# Patient Record
Sex: FEMALE | Race: WHITE
[De-identification: names, ages, dates, MRNs, and addresses within clinical notes are randomized per-mention and may not be internally consistent; named-entity substitution may affect disease eponyms.]

---

## 2018-12-19 ENCOUNTER — HOSPITAL ENCOUNTER (EMERGENCY)
Dept: HOSPITAL 96 - M.ERS | Age: 58
Discharge: HOME | End: 2018-12-19
Payer: COMMERCIAL

## 2018-12-19 VITALS — DIASTOLIC BLOOD PRESSURE: 73 MMHG | SYSTOLIC BLOOD PRESSURE: 121 MMHG

## 2018-12-19 VITALS — BODY MASS INDEX: 31.01 KG/M2 | HEIGHT: 63 IN | WEIGHT: 175 LBS

## 2018-12-19 DIAGNOSIS — R07.89: ICD-10-CM

## 2018-12-19 DIAGNOSIS — F32.9: ICD-10-CM

## 2018-12-19 DIAGNOSIS — Z98.890: ICD-10-CM

## 2018-12-19 DIAGNOSIS — K52.9: Primary | ICD-10-CM

## 2018-12-19 LAB
ABSOLUTE BASOPHILS: 0 THOU/UL (ref 0–0.2)
ABSOLUTE EOSINOPHILS: 0.1 THOU/UL (ref 0–0.7)
ABSOLUTE MONOCYTES: 0.6 THOU/UL (ref 0–1.2)
ALBUMIN SERPL-MCNC: 4 G/DL (ref 3.4–5)
ALP SERPL-CCNC: 81 U/L (ref 46–116)
ALT SERPL-CCNC: 32 U/L (ref 30–65)
ANION GAP SERPL CALC-SCNC: 8 MMOL/L (ref 7–16)
APTT BLD: 19.1 SECONDS (ref 25–31.3)
AST SERPL-CCNC: 21 U/L (ref 15–37)
BASOPHILS NFR BLD AUTO: 0.2 %
BILIRUB SERPL-MCNC: 0.4 MG/DL
BUN SERPL-MCNC: 12 MG/DL (ref 7–18)
CALCIUM SERPL-MCNC: 9.2 MG/DL (ref 8.5–10.1)
CHLORIDE SERPL-SCNC: 103 MMOL/L (ref 98–107)
CK-MB MASS: 0.9 NG/ML
CO2 SERPL-SCNC: 30 MMOL/L (ref 21–32)
CREAT SERPL-MCNC: 0.8 MG/DL (ref 0.6–1.3)
EOSINOPHIL NFR BLD: 0.9 %
GLUCOSE SERPL-MCNC: 150 MG/DL (ref 70–99)
GRANULOCYTES NFR BLD MANUAL: 79.6 %
HCT VFR BLD CALC: 43.8 % (ref 37–47)
HGB BLD-MCNC: 14.7 GM/DL (ref 12–15)
INR PPP: 0.9
LIPASE: 133 U/L (ref 73–393)
LYMPHOCYTES # BLD: 1.8 THOU/UL (ref 0.8–5.3)
LYMPHOCYTES NFR BLD AUTO: 14.5 %
MAGNESIUM SERPL-MCNC: 1.9 MG/DL (ref 1.8–2.4)
MCH RBC QN AUTO: 32.4 PG (ref 26–34)
MCHC RBC AUTO-ENTMCNC: 33.5 G/DL (ref 28–37)
MCV RBC: 96.7 FL (ref 80–100)
MONOCYTES NFR BLD: 4.8 %
MPV: 8.7 FL. (ref 7.2–11.1)
NEUTROPHILS # BLD: 9.8 THOU/UL (ref 1.6–8.1)
NT-PRO BRAIN NAT PEPTIDE: 41 PG/ML (ref ?–300)
NUCLEATED RBCS: 0 /100WBC
PLATELET COUNT*: 268 THOU/UL (ref 150–400)
POTASSIUM SERPL-SCNC: 3.9 MMOL/L (ref 3.5–5.1)
PROT SERPL-MCNC: 7.5 G/DL (ref 6.4–8.2)
PROTHROMBIN TIME: 9.5 SECONDS (ref 9.2–11.5)
RBC # BLD AUTO: 4.53 MIL/UL (ref 4.2–5)
RDW-CV: 13 % (ref 10.5–14.5)
SODIUM SERPL-SCNC: 141 MMOL/L (ref 136–145)
TROPONIN-I LEVEL: <0.06 NG/ML (ref ?–0.06)
WBC # BLD AUTO: 12.4 THOU/UL (ref 4–11)

## 2018-12-21 NOTE — EKG
[place] [address]
Phone:  [phone]                     ELECTROCARDIOGRAM REPORT      
_______________________________________________________________________________
 
Name:       JUANIS MALIN                  Room:                      [facility]#:  [id number]      Account #:      [id number]  
Admission:  18     Attend Phys:                         
Discharge:  18     Date of Birth:  [birth year]  
         Report #: [id number]
    [id number]
_______________________________________________________________________________
THIS REPORT FOR:  //name//                      
 
                         [facility] ED
                                       
Test Date:    2018               Test Time:    19:13:59
Pat Name:     JUANIS MALIN              Department:   
Patient ID:   [id number]            Room:          
Gender:       F                        Technician:   CHOLO
:          1960               Requested By: Carl Thakur
Order Number: [id number] MD:   John Reed
                                 Measurements
Intervals                              Axis          
Rate:         102                      P:            49
NH:           149                      QRS:          -20
QRSD:         95                       T:            45
QT:           337                                    
QTc:          439                                    
                           Interpretive Statements
Sinus tachycardia
Probable left atrial enlargement
Borderline left axis deviation
Compared to ECG 2017 21:32:46
Sinus rate has increased
 
Electronically Signed On 2018 14:43:41 CST by John Reed
[email or web address]
 
 
 
 
 
 
 
 
 
 
 
 
 
 
 
 
 
  <ELECTRONICALLY SIGNED>
                                           By: John Reed MD, [facility]     
  18     1443
D: 18   _____________________________________
T: 18   John Reed MD, FACC       /EPI

## 2018-12-22 ENCOUNTER — HOSPITAL ENCOUNTER (INPATIENT)
Dept: HOSPITAL 96 - M.ERS | Age: 58
LOS: 10 days | Discharge: HOME | DRG: 368 | End: 2019-01-01
Attending: FAMILY MEDICINE | Admitting: FAMILY MEDICINE
Payer: COMMERCIAL

## 2018-12-22 VITALS — HEIGHT: 63 IN | WEIGHT: 203 LBS | BODY MASS INDEX: 35.97 KG/M2

## 2018-12-22 VITALS — SYSTOLIC BLOOD PRESSURE: 112 MMHG | DIASTOLIC BLOOD PRESSURE: 67 MMHG

## 2018-12-22 VITALS — DIASTOLIC BLOOD PRESSURE: 73 MMHG | SYSTOLIC BLOOD PRESSURE: 128 MMHG

## 2018-12-22 VITALS — SYSTOLIC BLOOD PRESSURE: 107 MMHG | DIASTOLIC BLOOD PRESSURE: 62 MMHG

## 2018-12-22 VITALS — SYSTOLIC BLOOD PRESSURE: 146 MMHG | DIASTOLIC BLOOD PRESSURE: 99 MMHG

## 2018-12-22 VITALS — SYSTOLIC BLOOD PRESSURE: 131 MMHG | DIASTOLIC BLOOD PRESSURE: 81 MMHG

## 2018-12-22 DIAGNOSIS — Z98.891: ICD-10-CM

## 2018-12-22 DIAGNOSIS — K63.9: ICD-10-CM

## 2018-12-22 DIAGNOSIS — B37.0: ICD-10-CM

## 2018-12-22 DIAGNOSIS — K57.30: ICD-10-CM

## 2018-12-22 DIAGNOSIS — F32.9: ICD-10-CM

## 2018-12-22 DIAGNOSIS — R65.11: ICD-10-CM

## 2018-12-22 DIAGNOSIS — D69.6: ICD-10-CM

## 2018-12-22 DIAGNOSIS — K31.9: ICD-10-CM

## 2018-12-22 DIAGNOSIS — K76.89: ICD-10-CM

## 2018-12-22 DIAGNOSIS — Z79.899: ICD-10-CM

## 2018-12-22 DIAGNOSIS — R74.0: ICD-10-CM

## 2018-12-22 DIAGNOSIS — A04.9: ICD-10-CM

## 2018-12-22 DIAGNOSIS — E86.0: ICD-10-CM

## 2018-12-22 DIAGNOSIS — E87.6: ICD-10-CM

## 2018-12-22 DIAGNOSIS — Z90.710: ICD-10-CM

## 2018-12-22 DIAGNOSIS — K64.9: ICD-10-CM

## 2018-12-22 DIAGNOSIS — Z85.820: ICD-10-CM

## 2018-12-22 DIAGNOSIS — Z90.49: ICD-10-CM

## 2018-12-22 DIAGNOSIS — K62.89: ICD-10-CM

## 2018-12-22 DIAGNOSIS — B37.81: Primary | ICD-10-CM

## 2018-12-22 DIAGNOSIS — K26.9: ICD-10-CM

## 2018-12-22 DIAGNOSIS — E66.9: ICD-10-CM

## 2018-12-22 DIAGNOSIS — K29.70: ICD-10-CM

## 2018-12-22 LAB
ABSOLUTE BASOPHILS: 0.1 THOU/UL (ref 0–0.2)
ABSOLUTE EOSINOPHILS: 0.6 THOU/UL (ref 0–0.7)
ABSOLUTE MONOCYTES: 0.3 THOU/UL (ref 0–1.2)
ALBUMIN SERPL-MCNC: 3.5 G/DL (ref 3.4–5)
ALP SERPL-CCNC: 242 U/L (ref 46–116)
ALT SERPL-CCNC: 305 U/L (ref 30–65)
ANION GAP SERPL CALC-SCNC: 10 MMOL/L (ref 7–16)
AST SERPL-CCNC: 198 U/L (ref 15–37)
BASOPHILS NFR BLD AUTO: 1 %
BILIRUB SERPL-MCNC: 1.6 MG/DL
BUN SERPL-MCNC: 6 MG/DL (ref 7–18)
CALCIUM SERPL-MCNC: 8.6 MG/DL (ref 8.5–10.1)
CHLORIDE SERPL-SCNC: 95 MMOL/L (ref 98–107)
CO2 SERPL-SCNC: 28 MMOL/L (ref 21–32)
CREAT SERPL-MCNC: 0.8 MG/DL (ref 0.6–1.3)
EOSINOPHIL NFR BLD: 5 %
GLUCOSE SERPL-MCNC: 110 MG/DL (ref 70–99)
GRANULOCYTES NFR BLD MANUAL: 84 %
HCT VFR BLD CALC: 43.2 % (ref 37–47)
HGB BLD-MCNC: 14.5 GM/DL (ref 12–15)
LIPASE: 94 U/L (ref 73–393)
LYMPHOCYTES # BLD: 0.6 THOU/UL (ref 0.8–5.3)
LYMPHOCYTES NFR BLD AUTO: 5 %
MCH RBC QN AUTO: 32.2 PG (ref 26–34)
MCHC RBC AUTO-ENTMCNC: 33.5 G/DL (ref 28–37)
MCV RBC: 96.2 FL (ref 80–100)
MONOCYTES NFR BLD: 3 %
MPV: 8.4 FL. (ref 7.2–11.1)
NEUTROPHILS # BLD: 9.7 THOU/UL (ref 1.6–8.1)
NEUTS BAND NFR BLD: 2 %
NUCLEATED RBCS: 0 /100WBC
PLATELET # BLD EST: ADEQUATE 10*3/UL
PLATELET COUNT*: 143 THOU/UL (ref 150–400)
POTASSIUM SERPL-SCNC: 3.6 MMOL/L (ref 3.5–5.1)
PROT SERPL-MCNC: 7.4 G/DL (ref 6.4–8.2)
RBC # BLD AUTO: 4.5 MIL/UL (ref 4.2–5)
RDW-CV: 13 % (ref 10.5–14.5)
SODIUM SERPL-SCNC: 133 MMOL/L (ref 136–145)
TARGETS BLD QL SMEAR: (no result)
WBC # BLD AUTO: 11.3 THOU/UL (ref 4–11)

## 2018-12-22 NOTE — PROC
[address]  [address]                    PROCEDURE REPORT              
_______________________________________________________________________________
 
Name:       MALIN,JUANIS NADIRA                  Room:           [address] [place]  
.R.#:  [id number]      Account #:      [id number]  
Admission:  12/22/18     Attend Phys:    Sudheer Trevino MD
Discharge:               Date of Birth:  [birth year]  
         Report #: [id number]
                                                                                
_______________________________________________________________________________
THIS REPORT FOR:  //name//                      
 
For GI report, please see the Provation report in Perceptive 7 content.
 
 
 
 
 
 
 
 
 
 
 
 
 
 
 
 
 
 
 
 
 
 
 
 
 
 
 
 
 
 
 
 
 
 
 
 
 
 
 
 
 
                       
                                        By:                                
                 
D: 12/24/18     _______________________________________
T: 12/28/18 1227Medical Records Staff PARKER       /AL

## 2018-12-23 VITALS — DIASTOLIC BLOOD PRESSURE: 72 MMHG | SYSTOLIC BLOOD PRESSURE: 110 MMHG

## 2018-12-23 VITALS — SYSTOLIC BLOOD PRESSURE: 112 MMHG | DIASTOLIC BLOOD PRESSURE: 63 MMHG

## 2018-12-23 VITALS — SYSTOLIC BLOOD PRESSURE: 131 MMHG | DIASTOLIC BLOOD PRESSURE: 72 MMHG

## 2018-12-23 LAB
ABSOLUTE BASOPHILS: 0 THOU/UL (ref 0–0.2)
ABSOLUTE EOSINOPHILS: 0.3 THOU/UL (ref 0–0.7)
ABSOLUTE MONOCYTES: 0.5 THOU/UL (ref 0–1.2)
ALBUMIN SERPL-MCNC: 2.5 G/DL (ref 3.4–5)
ALP SERPL-CCNC: 194 U/L (ref 46–116)
ALT SERPL-CCNC: 183 U/L (ref 30–65)
ANION GAP SERPL CALC-SCNC: 11 MMOL/L (ref 7–16)
ANION GAP SERPL CALC-SCNC: 9 MMOL/L (ref 7–16)
AST SERPL-CCNC: 98 U/L (ref 15–37)
BASOPHILS NFR BLD AUTO: 0.3 %
BILIRUB SERPL-MCNC: 1.6 MG/DL
BUN SERPL-MCNC: 4 MG/DL (ref 7–18)
BUN SERPL-MCNC: 4 MG/DL (ref 7–18)
CALCIUM SERPL-MCNC: 7.3 MG/DL (ref 8.5–10.1)
CALCIUM SERPL-MCNC: 7.7 MG/DL (ref 8.5–10.1)
CHLORIDE SERPL-SCNC: 104 MMOL/L (ref 98–107)
CHLORIDE SERPL-SCNC: 105 MMOL/L (ref 98–107)
CO2 SERPL-SCNC: 22 MMOL/L (ref 21–32)
CO2 SERPL-SCNC: 25 MMOL/L (ref 21–32)
CREAT SERPL-MCNC: 0.6 MG/DL (ref 0.6–1.3)
CREAT SERPL-MCNC: 0.7 MG/DL (ref 0.6–1.3)
EOSINOPHIL NFR BLD: 5.5 %
GLUCOSE SERPL-MCNC: 106 MG/DL (ref 70–99)
GLUCOSE SERPL-MCNC: 106 MG/DL (ref 70–99)
GRANULOCYTES NFR BLD MANUAL: 78.2 %
HCT VFR BLD CALC: 38.4 % (ref 37–47)
HCT VFR BLD CALC: 39.6 % (ref 37–47)
HGB BLD-MCNC: 13 GM/DL (ref 12–15)
HGB BLD-MCNC: 13.1 GM/DL (ref 12–15)
INR PPP: 1.2
LYMPHOCYTES # BLD: 0.5 THOU/UL (ref 0.8–5.3)
LYMPHOCYTES NFR BLD AUTO: 7.8 %
MAGNESIUM SERPL-MCNC: 1.8 MG/DL (ref 1.8–2.4)
MAGNESIUM SERPL-MCNC: 1.9 MG/DL (ref 1.8–2.4)
MCH RBC QN AUTO: 32.4 PG (ref 26–34)
MCH RBC QN AUTO: 32.6 PG (ref 26–34)
MCHC RBC AUTO-ENTMCNC: 33.1 G/DL (ref 28–37)
MCHC RBC AUTO-ENTMCNC: 33.8 G/DL (ref 28–37)
MCV RBC: 96.2 FL (ref 80–100)
MCV RBC: 98 FL (ref 80–100)
MONOCYTES NFR BLD: 8.2 %
MPV: 8.6 FL. (ref 7.2–11.1)
MPV: 8.9 FL. (ref 7.2–11.1)
NEUTROPHILS # BLD: 4.7 THOU/UL (ref 1.6–8.1)
NUCLEATED RBCS: 0 /100WBC
PLATELET COUNT*: 121 THOU/UL (ref 150–400)
PLATELET COUNT*: 95 THOU/UL (ref 150–400)
POTASSIUM SERPL-SCNC: 3.3 MMOL/L (ref 3.5–5.1)
POTASSIUM SERPL-SCNC: 3.7 MMOL/L (ref 3.5–5.1)
PROT SERPL-MCNC: 4.8 G/DL (ref 6.4–8.2)
PROTHROMBIN TIME: 12.5 SECONDS (ref 9.2–11.5)
RBC # BLD AUTO: 3.99 MIL/UL (ref 4.2–5)
RBC # BLD AUTO: 4.04 MIL/UL (ref 4.2–5)
RDW-CV: 13.2 % (ref 10.5–14.5)
RDW-CV: 13.3 % (ref 10.5–14.5)
SODIUM SERPL-SCNC: 137 MMOL/L (ref 136–145)
SODIUM SERPL-SCNC: 139 MMOL/L (ref 136–145)
WBC # BLD AUTO: 6 THOU/UL (ref 4–11)
WBC # BLD AUTO: 7.4 THOU/UL (ref 4–11)

## 2018-12-23 NOTE — EKG
[place] [address]
Phone:  [phone]                     ELECTROCARDIOGRAM REPORT      
_______________________________________________________________________________
 
Name:       JUANIS MALIN                  Room:           [address]    ADM [place]  
[facility]#:  [id number]      Account #:      [id number]  
Admission:  18     Attend Phys:    Sudheer Trevino MD
Discharge:               Date of Birth:  [birth year]  
         Report #: [id number]
    [id number]
_______________________________________________________________________________
THIS REPORT FOR:  //name//                      
 
                         [facility] ED
                                       
Test Date:    2018               Test Time:    12:47:37
Pat Name:     JUANIS MALIN              Department:   
Patient ID:   [id number]            Room:         [facility]
Gender:       F                        Technician:   DENISE EDGAR
:          1960               Requested By: Carl Thakur
Order Number: [id number] MD:   Phil Max
                                 Measurements
Intervals                              Axis          
Rate:         100                      P:            
NC:                                    QRS:          -7
QRSD:         146                      T:            32
QT:           344                                    
QTc:          444                                    
                           Interpretive Statements
sinus tachycardia
artifact noted
Baseline wander in lead(s) V2
Compared to ECG 2018 19:13:59
no change
 
Electronically Signed On 2018 13:43:04 CST by Phil Max
[email or web address]
 
 
 
 
 
 
 
 
 
 
 
 
 
 
 
 
 
  <ELECTRONICALLY SIGNED>
                                           By: Phil Max MD, [facility]      
  18     1343
D: 18 1247   _____________________________________
T: 18 1247   Phil Max MD, [facility]        /EPI

## 2018-12-24 VITALS — DIASTOLIC BLOOD PRESSURE: 73 MMHG | SYSTOLIC BLOOD PRESSURE: 139 MMHG

## 2018-12-24 VITALS — SYSTOLIC BLOOD PRESSURE: 124 MMHG | DIASTOLIC BLOOD PRESSURE: 68 MMHG

## 2018-12-24 VITALS — SYSTOLIC BLOOD PRESSURE: 130 MMHG | DIASTOLIC BLOOD PRESSURE: 80 MMHG

## 2018-12-24 VITALS — DIASTOLIC BLOOD PRESSURE: 59 MMHG | SYSTOLIC BLOOD PRESSURE: 132 MMHG

## 2018-12-24 LAB
ALBUMIN SERPL-MCNC: 2.3 G/DL (ref 3.4–5)
ALP SERPL-CCNC: 202 U/L (ref 46–116)
ALT SERPL-CCNC: 139 U/L (ref 30–65)
ANION GAP SERPL CALC-SCNC: 5 MMOL/L (ref 7–16)
AST SERPL-CCNC: 65 U/L (ref 15–37)
BILIRUB SERPL-MCNC: 1.1 MG/DL
BUN SERPL-MCNC: 4 MG/DL (ref 7–18)
CALCIUM SERPL-MCNC: 7.9 MG/DL (ref 8.5–10.1)
CHLORIDE SERPL-SCNC: 109 MMOL/L (ref 98–107)
CO2 SERPL-SCNC: 25 MMOL/L (ref 21–32)
CREAT SERPL-MCNC: 0.5 MG/DL (ref 0.6–1.3)
GLUCOSE SERPL-MCNC: 99 MG/DL (ref 70–99)
HCT VFR BLD CALC: 38.4 % (ref 37–47)
HGB BLD-MCNC: 12.9 GM/DL (ref 12–15)
MAGNESIUM SERPL-MCNC: 1.8 MG/DL (ref 1.8–2.4)
MCH RBC QN AUTO: 32.4 PG (ref 26–34)
MCHC RBC AUTO-ENTMCNC: 33.5 G/DL (ref 28–37)
MCV RBC: 96.5 FL (ref 80–100)
MPV: 9.3 FL. (ref 7.2–11.1)
PLATELET COUNT*: 132 THOU/UL (ref 150–400)
POTASSIUM SERPL-SCNC: 3.7 MMOL/L (ref 3.5–5.1)
PROT SERPL-MCNC: 5.1 G/DL (ref 6.4–8.2)
RBC # BLD AUTO: 3.98 MIL/UL (ref 4.2–5)
RDW-CV: 13.3 % (ref 10.5–14.5)
SODIUM SERPL-SCNC: 139 MMOL/L (ref 136–145)
WBC # BLD AUTO: 6.9 THOU/UL (ref 4–11)

## 2018-12-24 PROCEDURE — 0DB68ZX EXCISION OF STOMACH, VIA NATURAL OR ARTIFICIAL OPENING ENDOSCOPIC, DIAGNOSTIC: ICD-10-PCS | Performed by: INTERNAL MEDICINE

## 2018-12-24 PROCEDURE — 0DB98ZX EXCISION OF DUODENUM, VIA NATURAL OR ARTIFICIAL OPENING ENDOSCOPIC, DIAGNOSTIC: ICD-10-PCS | Performed by: INTERNAL MEDICINE

## 2018-12-24 PROCEDURE — 0DBE8ZX EXCISION OF LARGE INTESTINE, VIA NATURAL OR ARTIFICIAL OPENING ENDOSCOPIC, DIAGNOSTIC: ICD-10-PCS | Performed by: INTERNAL MEDICINE

## 2018-12-24 PROCEDURE — 0DB58ZX EXCISION OF ESOPHAGUS, VIA NATURAL OR ARTIFICIAL OPENING ENDOSCOPIC, DIAGNOSTIC: ICD-10-PCS | Performed by: INTERNAL MEDICINE

## 2018-12-24 NOTE — CON
[address]  [address]                    CONSULTATION                  
_______________________________________________________________________________
 
Name:       JUANIS MALIN                  Room:           [address] [place]  
M.R.#:  [id number]      Account #:      [id number]  
Admission:  12/22/18     Attend Phys:    Sudheer Trevino MD
Discharge:               Date of Birth:  [birth year]  
         Report #: [id number]
                                                                     [id number]  
_______________________________________________________________________________
THIS REPORT FOR:  //name//                      
 
CC: [facility] physician/PCP
    Sudheer Trevino
 
DATE OF SERVICE:  12/23/2018
 
 
INFECTIOUS DISEASE CONSULTATION
 
ATTENDING PHYSICIAN:  Sudheer Trevino M.D.
 
REASON FOR EVALUATION:  Febrile illness complicated by diarrhea, nausea, emesis
and hepatitis.
 
HISTORY OF PRESENT ILLNESS:  Chart reviewed, the patient examined.  This is a
58-year-old woman who does have a history of melanoma and also pancreatitis, who
has been ill for roughly 2 weeks.  She was initially seen in the Emergency Room
on 12/19/2018 and was discharged on antibiotics and symptomatic treatment. 
However, returned due to worsening nausea with emesis and had some fevers, some
of which have been high grade, and profound fatigue.  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
healthcare facility and was worried about C. diff colitis.  In spite of empiric
therapy, she has not improved.  She has had a poor p.o. intake, concerned about
dehydration as well.  She was found to have elevated hepatic transaminases,
underwent an MRCP, which was otherwise unremarkable.  CT of the abdomen and
pelvis did show slight thick-walled appearance of transverse and descending
colon.  Blood cultures have been sterile.  Lactic acid is 1.4.  Influenza
antigen was negative.  She has been evaluated by Gastroenterology and is
scheduled to undergo endoscopy.  Empirically started on ciprofloxacin and
Flagyl.  This was switched to cefepime.  At this point, she is not overtly
toxic.  She states she feels somewhat better.  Most recent stool was formed. 
She has not had the diarrhea with her most recent stool.
 
ALLERGIES:  None known.
 
MEDICATIONS:  Include trazodone, enoxaparin, lorazepam, metoclopramide,
ondansetron, cefepime and ibuprofen.
 
PAST MEDICAL HISTORY:  As described above, history of melanoma, pancreatitis,
history of depression, benign left ovarian tumor, hysterectomy and
cholecystectomy.
 
SOCIAL HISTORY:  Nonsmoker.  No occasional ethanol.
 
FAMILY HISTORY:  Noncontributory.
 
 
 
 
[place] [address]                    CONSULTATION                  
_______________________________________________________________________________
 
Name:       JUANIS MALIN                  Room:           [address] [place]  
[facility].#:  [id number]      Account #:      [id number]  
Admission:  12/22/18     Attend Phys:    Sudheer Trevino MD
Discharge:               Date of Birth:  [birth year]  
         Report #: [id number]
                                                                     [id number]  
_______________________________________________________________________________
REVIEW OF SYSTEMS:  Denies any significant pulmonary-related complaints. 
Otherwise, 10-point review of systems is unremarkable, with the exception noted
from the above.
 
PHYSICAL EXAMINATION:
GENERAL:  She is alert and cooperative.  She appears somewhat ill, not overtly
toxic, reasonably well nourished.
VITAL SIGNS:  Temperature max overnight 102.7, more recently 98; pulse 95;
respirations 16 and blood pressure 110/72.
SKIN:  Warm.  There are no rashes.
HEENT:  Extraocular muscles intact.  No oral lesions.
NECK:  Supple.
HEART:  Regular.  I do not appreciate any murmur.
ABDOMEN:  Soft.
LUNGS:  Clear to auscultation.
ABDOMEN:  Soft, nontender and nondistended.  There are no peritoneal signs.
GENITOURINARY:  Deferred.
RECTAL:  Deferred.
 
LABORATORY DATA:  Blood cultures sterile thus far.  Recent PT 12.5, INR of 1.2. 
CBC:  White count of 7.4, H and H 13.0 and 38.4 and platelets of 121,000. 
Electrolytes:  Sodium 137, potassium 3.3, chloride 104, bicarbonate 22, anion
gap of 11, BUN and creatinine 4 and 0.7 and glucose of 106.  AST slightly lower
at 98, ALT of 183.  Albumin 2.5.  Total protein 7.8.  Estimated GFR of 87.
 
ASSESSMENT AND PLAN:  Febrile illness with primary gastrointestinal-related
signs and symptoms.  Would suspect infectious etiology.  We will continue
empiric antimicrobial therapy.  At this point, stool studies may not be
particularly helpful given the resolution of the diarrhea.  We will follow her
temperature curve and pending culture results of other studies as well, we will
await GI evaluation including endoscopy, in the event something in the clinical
picture directs us.  We will do additional diagnostic testing.  Continue
supportive care otherwise.
 
 
 
 
 
 
 
 
 
 
 
<ELECTRONICALLY SIGNED>
                                        By:  Yonis Paulson MD           
12/24/18     0741
D: 12/23/18 1239_______________________________________
T: 12/23/18 2226Joobed Paulson MD              /nt

## 2018-12-25 VITALS — SYSTOLIC BLOOD PRESSURE: 154 MMHG | DIASTOLIC BLOOD PRESSURE: 82 MMHG

## 2018-12-25 VITALS — DIASTOLIC BLOOD PRESSURE: 77 MMHG | SYSTOLIC BLOOD PRESSURE: 160 MMHG

## 2018-12-25 LAB
ABSOLUTE BASOPHILS: 0 THOU/UL (ref 0–0.2)
ABSOLUTE EOSINOPHILS: 0.4 THOU/UL (ref 0–0.7)
ABSOLUTE MONOCYTES: 0.7 THOU/UL (ref 0–1.2)
ALBUMIN SERPL-MCNC: 2.4 G/DL (ref 3.4–5)
ALP SERPL-CCNC: 206 U/L (ref 46–116)
ALT SERPL-CCNC: 130 U/L (ref 30–65)
ANION GAP SERPL CALC-SCNC: 9 MMOL/L (ref 7–16)
AST SERPL-CCNC: 56 U/L (ref 15–37)
BASOPHILS NFR BLD AUTO: 0.3 %
BILIRUB SERPL-MCNC: 0.6 MG/DL
BUN SERPL-MCNC: 4 MG/DL (ref 7–18)
CALCIUM SERPL-MCNC: 7.7 MG/DL (ref 8.5–10.1)
CHLORIDE SERPL-SCNC: 109 MMOL/L (ref 98–107)
CO2 SERPL-SCNC: 25 MMOL/L (ref 21–32)
CREAT SERPL-MCNC: 0.5 MG/DL (ref 0.6–1.3)
EOSINOPHIL NFR BLD: 5.9 %
GLUCOSE SERPL-MCNC: 109 MG/DL (ref 70–99)
GRANULOCYTES NFR BLD MANUAL: 55.3 %
HCT VFR BLD CALC: 36.6 % (ref 37–47)
HGB BLD-MCNC: 12.5 GM/DL (ref 12–15)
LYMPHOCYTES # BLD: 1.9 THOU/UL (ref 0.8–5.3)
LYMPHOCYTES NFR BLD AUTO: 28.3 %
MCH RBC QN AUTO: 32.5 PG (ref 26–34)
MCHC RBC AUTO-ENTMCNC: 34 G/DL (ref 28–37)
MCV RBC: 95.6 FL (ref 80–100)
MONOCYTES NFR BLD: 10.2 %
MPV: 9.3 FL. (ref 7.2–11.1)
NEUTROPHILS # BLD: 3.8 THOU/UL (ref 1.6–8.1)
NUCLEATED RBCS: 0 /100WBC
PLATELET COUNT*: 150 THOU/UL (ref 150–400)
POTASSIUM SERPL-SCNC: 3.1 MMOL/L (ref 3.5–5.1)
PROT SERPL-MCNC: 5.2 G/DL (ref 6.4–8.2)
RBC # BLD AUTO: 3.83 MIL/UL (ref 4.2–5)
RDW-CV: 13.3 % (ref 10.5–14.5)
SODIUM SERPL-SCNC: 143 MMOL/L (ref 136–145)
WBC # BLD AUTO: 6.8 THOU/UL (ref 4–11)

## 2018-12-26 VITALS — SYSTOLIC BLOOD PRESSURE: 118 MMHG | DIASTOLIC BLOOD PRESSURE: 66 MMHG

## 2018-12-26 VITALS — DIASTOLIC BLOOD PRESSURE: 76 MMHG | SYSTOLIC BLOOD PRESSURE: 133 MMHG

## 2018-12-26 VITALS — SYSTOLIC BLOOD PRESSURE: 120 MMHG | DIASTOLIC BLOOD PRESSURE: 66 MMHG

## 2018-12-26 VITALS — DIASTOLIC BLOOD PRESSURE: 78 MMHG | SYSTOLIC BLOOD PRESSURE: 128 MMHG

## 2018-12-26 LAB
ABSOLUTE BASOPHILS: 0 THOU/UL (ref 0–0.2)
ABSOLUTE EOSINOPHILS: 0.5 THOU/UL (ref 0–0.7)
ABSOLUTE MONOCYTES: 0.9 THOU/UL (ref 0–1.2)
ALBUMIN SERPL-MCNC: 2.6 G/DL (ref 3.4–5)
ALP SERPL-CCNC: 216 U/L (ref 46–116)
ALT SERPL-CCNC: 215 U/L (ref 30–65)
ANION GAP SERPL CALC-SCNC: 7 MMOL/L (ref 7–16)
AST SERPL-CCNC: 221 U/L (ref 15–37)
BASOPHILS NFR BLD AUTO: 0.6 %
BILIRUB SERPL-MCNC: 0.5 MG/DL
BUN SERPL-MCNC: 7 MG/DL (ref 7–18)
CALCIUM SERPL-MCNC: 7.8 MG/DL (ref 8.5–10.1)
CHLORIDE SERPL-SCNC: 110 MMOL/L (ref 98–107)
CO2 SERPL-SCNC: 27 MMOL/L (ref 21–32)
CREAT SERPL-MCNC: 0.6 MG/DL (ref 0.6–1.3)
EOSINOPHIL NFR BLD: 5.9 %
GLUCOSE SERPL-MCNC: 104 MG/DL (ref 70–99)
GRANULOCYTES NFR BLD MANUAL: 46.8 %
HCT VFR BLD CALC: 35.3 % (ref 37–47)
HGB BLD-MCNC: 12 GM/DL (ref 12–15)
LYMPHOCYTES # BLD: 2.7 THOU/UL (ref 0.8–5.3)
LYMPHOCYTES NFR BLD AUTO: 35.3 %
MCH RBC QN AUTO: 32.6 PG (ref 26–34)
MCHC RBC AUTO-ENTMCNC: 34 G/DL (ref 28–37)
MCV RBC: 95.9 FL (ref 80–100)
MONOCYTES NFR BLD: 11.4 %
MPV: 9.6 FL. (ref 7.2–11.1)
NEUTROPHILS # BLD: 3.6 THOU/UL (ref 1.6–8.1)
NUCLEATED RBCS: 0 /100WBC
PLATELET COUNT*: 182 THOU/UL (ref 150–400)
POTASSIUM SERPL-SCNC: 3.4 MMOL/L (ref 3.5–5.1)
PROT SERPL-MCNC: 5.5 G/DL (ref 6.4–8.2)
RBC # BLD AUTO: 3.69 MIL/UL (ref 4.2–5)
RDW-CV: 13.4 % (ref 10.5–14.5)
SODIUM SERPL-SCNC: 144 MMOL/L (ref 136–145)
WBC # BLD AUTO: 7.7 THOU/UL (ref 4–11)

## 2018-12-27 VITALS — SYSTOLIC BLOOD PRESSURE: 126 MMHG | DIASTOLIC BLOOD PRESSURE: 73 MMHG

## 2018-12-27 VITALS — DIASTOLIC BLOOD PRESSURE: 60 MMHG | SYSTOLIC BLOOD PRESSURE: 127 MMHG

## 2018-12-27 VITALS — SYSTOLIC BLOOD PRESSURE: 130 MMHG | DIASTOLIC BLOOD PRESSURE: 78 MMHG

## 2018-12-27 VITALS — DIASTOLIC BLOOD PRESSURE: 78 MMHG | SYSTOLIC BLOOD PRESSURE: 130 MMHG

## 2018-12-27 VITALS — SYSTOLIC BLOOD PRESSURE: 123 MMHG | DIASTOLIC BLOOD PRESSURE: 74 MMHG

## 2018-12-27 VITALS — DIASTOLIC BLOOD PRESSURE: 65 MMHG | SYSTOLIC BLOOD PRESSURE: 119 MMHG

## 2018-12-27 VITALS — DIASTOLIC BLOOD PRESSURE: 41 MMHG | SYSTOLIC BLOOD PRESSURE: 114 MMHG

## 2018-12-27 VITALS — DIASTOLIC BLOOD PRESSURE: 73 MMHG | SYSTOLIC BLOOD PRESSURE: 135 MMHG

## 2018-12-27 VITALS — SYSTOLIC BLOOD PRESSURE: 120 MMHG | DIASTOLIC BLOOD PRESSURE: 68 MMHG

## 2018-12-27 LAB
ALBUMIN SERPL-MCNC: 2.9 G/DL (ref 3.4–5)
ALP SERPL-CCNC: 254 U/L (ref 46–116)
ALT SERPL-CCNC: 402 U/L (ref 30–65)
ANION GAP SERPL CALC-SCNC: 7 MMOL/L (ref 7–16)
AST SERPL-CCNC: 341 U/L (ref 15–37)
BILIRUB SERPL-MCNC: 0.6 MG/DL
BUN SERPL-MCNC: 7 MG/DL (ref 7–18)
CALCIUM SERPL-MCNC: 8.4 MG/DL (ref 8.5–10.1)
CHLORIDE SERPL-SCNC: 107 MMOL/L (ref 98–107)
CO2 SERPL-SCNC: 28 MMOL/L (ref 21–32)
CREAT SERPL-MCNC: 0.5 MG/DL (ref 0.6–1.3)
GLUCOSE SERPL-MCNC: 95 MG/DL (ref 70–99)
HCT VFR BLD CALC: 36.1 % (ref 37–47)
HGB BLD-MCNC: 12.2 GM/DL (ref 12–15)
INR PPP: 1
MAGNESIUM SERPL-MCNC: 2 MG/DL (ref 1.8–2.4)
MCH RBC QN AUTO: 32.2 PG (ref 26–34)
MCHC RBC AUTO-ENTMCNC: 33.7 G/DL (ref 28–37)
MCV RBC: 95.4 FL (ref 80–100)
MPV: 8.6 FL. (ref 7.2–11.1)
PLATELET COUNT*: 230 THOU/UL (ref 150–400)
POTASSIUM SERPL-SCNC: 3.8 MMOL/L (ref 3.5–5.1)
PROT SERPL-MCNC: 5.9 G/DL (ref 6.4–8.2)
PROTHROMBIN TIME: 9.8 SECONDS (ref 9.2–11.5)
RBC # BLD AUTO: 3.79 MIL/UL (ref 4.2–5)
RDW-CV: 13.5 % (ref 10.5–14.5)
SODIUM SERPL-SCNC: 142 MMOL/L (ref 136–145)
WBC # BLD AUTO: 8.9 THOU/UL (ref 4–11)

## 2018-12-27 PROCEDURE — 0FB13ZX EXCISION OF RIGHT LOBE LIVER, PERCUTANEOUS APPROACH, DIAGNOSTIC: ICD-10-PCS | Performed by: RADIOLOGY

## 2018-12-28 VITALS — DIASTOLIC BLOOD PRESSURE: 67 MMHG | SYSTOLIC BLOOD PRESSURE: 142 MMHG

## 2018-12-28 VITALS — SYSTOLIC BLOOD PRESSURE: 104 MMHG | DIASTOLIC BLOOD PRESSURE: 66 MMHG

## 2018-12-28 LAB
ALBUMIN SERPL-MCNC: 2.9 G/DL (ref 3.4–5)
ALP SERPL-CCNC: 237 U/L (ref 46–116)
ALT SERPL-CCNC: 337 U/L (ref 30–65)
ANION GAP SERPL CALC-SCNC: 7 MMOL/L (ref 7–16)
AST SERPL-CCNC: 148 U/L (ref 15–37)
BILIRUB SERPL-MCNC: 0.6 MG/DL
BUN SERPL-MCNC: 12 MG/DL (ref 7–18)
CALCIUM SERPL-MCNC: 8.9 MG/DL (ref 8.5–10.1)
CHLORIDE SERPL-SCNC: 106 MMOL/L (ref 98–107)
CO2 SERPL-SCNC: 29 MMOL/L (ref 21–32)
CREAT SERPL-MCNC: 0.6 MG/DL (ref 0.6–1.3)
GLUCOSE SERPL-MCNC: 93 MG/DL (ref 70–99)
HCT VFR BLD CALC: 36.2 % (ref 37–47)
HGB BLD-MCNC: 12.1 GM/DL (ref 12–15)
MAGNESIUM SERPL-MCNC: 2.1 MG/DL (ref 1.8–2.4)
MCH RBC QN AUTO: 32.1 PG (ref 26–34)
MCHC RBC AUTO-ENTMCNC: 33.6 G/DL (ref 28–37)
MCV RBC: 95.6 FL (ref 80–100)
MPV: 8.9 FL. (ref 7.2–11.1)
PLATELET COUNT*: 272 THOU/UL (ref 150–400)
POTASSIUM SERPL-SCNC: 4.3 MMOL/L (ref 3.5–5.1)
PROT SERPL-MCNC: 5.5 G/DL (ref 6.4–8.2)
RBC # BLD AUTO: 3.79 MIL/UL (ref 4.2–5)
RDW-CV: 13.2 % (ref 10.5–14.5)
SODIUM SERPL-SCNC: 142 MMOL/L (ref 136–145)
WBC # BLD AUTO: 10.4 THOU/UL (ref 4–11)

## 2018-12-29 VITALS — SYSTOLIC BLOOD PRESSURE: 107 MMHG | DIASTOLIC BLOOD PRESSURE: 63 MMHG

## 2018-12-29 VITALS — DIASTOLIC BLOOD PRESSURE: 78 MMHG | SYSTOLIC BLOOD PRESSURE: 134 MMHG

## 2018-12-29 VITALS — SYSTOLIC BLOOD PRESSURE: 103 MMHG | DIASTOLIC BLOOD PRESSURE: 68 MMHG

## 2018-12-29 VITALS — DIASTOLIC BLOOD PRESSURE: 70 MMHG | SYSTOLIC BLOOD PRESSURE: 117 MMHG

## 2018-12-30 VITALS — SYSTOLIC BLOOD PRESSURE: 118 MMHG | DIASTOLIC BLOOD PRESSURE: 56 MMHG

## 2018-12-30 VITALS — SYSTOLIC BLOOD PRESSURE: 123 MMHG | DIASTOLIC BLOOD PRESSURE: 65 MMHG

## 2018-12-30 LAB
ABSOLUTE MONOCYTES: 1.6 THOU/UL (ref 0–1.2)
ALBUMIN SERPL-MCNC: 3.4 G/DL (ref 3.4–5)
ALP SERPL-CCNC: 204 U/L (ref 46–116)
ALT SERPL-CCNC: 193 U/L (ref 30–65)
ANION GAP SERPL CALC-SCNC: 8 MMOL/L (ref 7–16)
ANISOCYTOSIS BLD QL SMEAR: (no result)
AST SERPL-CCNC: 37 U/L (ref 15–37)
BILIRUB SERPL-MCNC: 0.5 MG/DL
BUN SERPL-MCNC: 13 MG/DL (ref 7–18)
CALCIUM SERPL-MCNC: 9.2 MG/DL (ref 8.5–10.1)
CHLORIDE SERPL-SCNC: 104 MMOL/L (ref 98–107)
CO2 SERPL-SCNC: 29 MMOL/L (ref 21–32)
CREAT SERPL-MCNC: 0.7 MG/DL (ref 0.6–1.3)
GLUCOSE SERPL-MCNC: 98 MG/DL (ref 70–99)
GRANULOCYTES NFR BLD MANUAL: 49 %
HCT VFR BLD CALC: 40.6 % (ref 37–47)
HGB BLD-MCNC: 13.4 GM/DL (ref 12–15)
LYMPHOCYTES # BLD: 3.6 THOU/UL (ref 0.8–5.3)
LYMPHOCYTES NFR BLD AUTO: 35 %
MCH RBC QN AUTO: 31.9 PG (ref 26–34)
MCHC RBC AUTO-ENTMCNC: 33 G/DL (ref 28–37)
MCV RBC: 96.6 FL (ref 80–100)
MONOCYTES NFR BLD: 16 %
MPV: 8.4 FL. (ref 7.2–11.1)
NEUTROPHILS # BLD: 5 THOU/UL (ref 1.6–8.1)
NUCLEATED RBCS: 0 /100WBC
PLATELET # BLD EST: ADEQUATE 10*3/UL
PLATELET COUNT*: 373 THOU/UL (ref 150–400)
POIKILOCYTOSIS BLD QL SMEAR: (no result)
POTASSIUM SERPL-SCNC: 4.4 MMOL/L (ref 3.5–5.1)
PROT SERPL-MCNC: 6.3 G/DL (ref 6.4–8.2)
RBC # BLD AUTO: 4.2 MIL/UL (ref 4.2–5)
RDW-CV: 13.7 % (ref 10.5–14.5)
SODIUM SERPL-SCNC: 141 MMOL/L (ref 136–145)
WBC # BLD AUTO: 10.2 THOU/UL (ref 4–11)

## 2018-12-31 VITALS — DIASTOLIC BLOOD PRESSURE: 62 MMHG | SYSTOLIC BLOOD PRESSURE: 105 MMHG

## 2018-12-31 VITALS — DIASTOLIC BLOOD PRESSURE: 62 MMHG | SYSTOLIC BLOOD PRESSURE: 100 MMHG

## 2018-12-31 VITALS — SYSTOLIC BLOOD PRESSURE: 110 MMHG | DIASTOLIC BLOOD PRESSURE: 71 MMHG

## 2018-12-31 VITALS — SYSTOLIC BLOOD PRESSURE: 119 MMHG | DIASTOLIC BLOOD PRESSURE: 99 MMHG

## 2018-12-31 NOTE — PATH
[address]  [address]                    PATHOLOGY RPT PROCEDURE       
_______________________________________________________________________________
 
Name:       JUANIS SHAVER                  Room:           [address] [place]  
.R.#:  [id number]      Account #:      [id number]  
Admission:  12/22/18     Date of Birth:  [birth year]  
Discharge:                             Report #:    [id number]
                                                         Path Case #: [id number]
_______________________________________________________________________________
 
[facility] Accession Number: [id number]
.                                                                01
Material submitted:                                        .
PART A: SMALL BOWEL BIOPSY
PART B: ANTRUM BIOPSY
PART C: ESOPHAGEAL BIOPSY
PART D: RANDOM COLON BIOPSY
.                                                                01
Clinical history:                                          .
A. Chronic diarrhea, esophagitis
B. H. pylori, gastric ulcers
C. Yeast
D. Chronic diarrhea
.                                                                02
**********************************************************************
Diagnosis:
A. Small bowel, biopsy:
- Mild non-specific active duodenitis.
- No evidence of granulomata or dysplasia.
- Normal villous architecture.
.
B. Stomach, antrum, biopsy:
- Chronic superficial gastritis, mild.
- No evidence of Helicobacter pylori on immunoperoxidase stain.
.
C. Esophagus, biopsy:
- Hyperplastic squamous epithelium without significant inflammation.
- Separate fragment of columnar epithelium with mild chronic inflammation.
- Rare yeast and fungal organisms present adjacent to fragments of
squamous epithelium.
.
D. Colon, random biopsies:
- Multiple fragments of colonic mucosa with no significant histopathologic
diagnosis.
- One fragment with focal minimal acute cryptitis.
- No granulomata seen.
- No evidence of chronic inflammatory bowel disease.
(SKM/db; 12/27/2018)
LBQ/12/27/2018
**********************************************************************
.                                                                02
Electronically signed:                                     .
Sandip Allen MD, Pathologist
NPI- [id number]
.                                                                01
Gross description:                                         .
A.  Received in formalin labeled "Sivakumar Juanis, small bowel BX," are 2
segments of tan soft tissue measuring 1.0 x 0.2 x 0.2 cm in [place] [address]                    PATHOLOGY RPT PROCEDURE       
_______________________________________________________________________________
 
Name:       JUANIS SHAVER NADIRA                  Room:           [address] [place]  
[facility]#:  [id number]      Account #:      [id number]  
Admission:  12/22/18     Date of Birth:  [birth year]  
Discharge:                             Report #:    [id number]
                                                         Path Case #: [id number]
_______________________________________________________________________________
dimensions and ranging from 0.4 to 0.6 cm in maximum dimension.  The
specimen is submitted entirely in cassette A1.
.
B.  Received in formalin labeled "ShaverJuanis boss, antrum BX," is a single
segment of tan soft tissue measuring 0.4 cm in maximum dimension.  The
specimen is entirely submitted in cassette B1.
.
C.  Received in formalin labeled "Juanis Shaver, esophageal BX," are
multiple segments of tan soft tissue measuring 0.7 x 0.2 x 0.1 cm in
aggregate dimensions.  The specimen is filtered and entirely submitted in
cassette C1.
.
D.  Received in formalin labeled "Juanis Shaver, random colon biopsy," are
multiple segments of tan soft tissue measuring 1.8 x 0.5 x 0.1 cm in
aggregate dimensions.  The specimen is filtered and entirely submitted in
cassette D1.
(TSD; 12/24/2018)
TOB/TOB
.                                                                02
Pathologist provided ICD-10:
K29.80, K29.30, B37.81
.                                                                02
CPT                                                        .
146492, 742950, 119301, 536012, Y64625
Specimen Comment: A courtesy copy of this report has been sent to
Specimen Comment: [phone], [phone], [phone].
Specimen Comment: Report sent to ,  and 
Specimen Comment: A duplicate report has been generated due to demographic
updates.
***Performed at:  01
   [facility] [address], [place], [place]  [id number]
   MD Conrad Posey MD Phone:  [phone]
***Performed at:  02
   [facility] [place] [address] [address], [place], [place]  [id number]
   MD Lc Deluna MD Phone:  [phone]

## 2019-01-01 VITALS — SYSTOLIC BLOOD PRESSURE: 116 MMHG | DIASTOLIC BLOOD PRESSURE: 73 MMHG

## 2019-01-01 VITALS — DIASTOLIC BLOOD PRESSURE: 73 MMHG | SYSTOLIC BLOOD PRESSURE: 116 MMHG

## 2019-01-01 LAB
ALBUMIN SERPL-MCNC: 3.5 G/DL (ref 3.4–5)
ALP SERPL-CCNC: 164 U/L (ref 46–116)
ALT SERPL-CCNC: 116 U/L (ref 30–65)
ANION GAP SERPL CALC-SCNC: 7 MMOL/L (ref 7–16)
AST SERPL-CCNC: 20 U/L (ref 15–37)
BILIRUB SERPL-MCNC: 0.7 MG/DL
BUN SERPL-MCNC: 16 MG/DL (ref 7–18)
CALCIUM SERPL-MCNC: 9.1 MG/DL (ref 8.5–10.1)
CHLORIDE SERPL-SCNC: 103 MMOL/L (ref 98–107)
CO2 SERPL-SCNC: 29 MMOL/L (ref 21–32)
CREAT SERPL-MCNC: 0.8 MG/DL (ref 0.6–1.3)
GLUCOSE SERPL-MCNC: 98 MG/DL (ref 70–99)
POTASSIUM SERPL-SCNC: 4.1 MMOL/L (ref 3.5–5.1)
PROT SERPL-MCNC: 6.7 G/DL (ref 6.4–8.2)
SODIUM SERPL-SCNC: 139 MMOL/L (ref 136–145)

## 2019-01-02 NOTE — PATH
[address]  [address]                    PATHOLOGY RPT PROCEDURE       
_______________________________________________________________________________
 
Name:       JUANIS SHAVER                  Room:           [address] [place]  
M.R.#:  [id number]      Account #:      [id number]  
Admission:  12/22/18     Date of Birth:  [birth year]  
Discharge:  01/01/19                   Report #:    [id number]
                                                         Path Case #: [id number]
_______________________________________________________________________________
 
[facility] Accession Number: [id number]
.                                                                01
Material submitted:                                        .
LIVER
.                                                                01
Clinical history:                                          .
Elevated LFTs
.                                                                02
**********************************************************************
Diagnosis:
Liver, core needle biopsy:
- Benign liver parenchyma.
- Final diagnosis pending consultation with pathologists at the [facility]
in [place], [place].
.
(SKM:at;12/28/2018)
QTA/12/28/2018
**********************************************************************
.                                                                02
Addendum:                                                       .
Special studies report received from [address] [address], on case -[id number], labeled with their number
[id number], dated 01/02/2019.
.
[facility]
Pathology Consultation
.
Pathology Consult
.
Interpretation
.
FINAL DIAGNOSIS
Liver, needle core biopsy ([id number], 12/27/2018): Mild mixed portal
inflammation with focally increased eosinophils with associated mild bile
duct injury and mild lobular necroinflammation. Minimal steatosis without
evidence of steatohepatitis. No fibrosis (Masha-Mykel stage 0 of 4). (See
comment.)
.
COMMENT
The available history is that of a 58-year-old female with elevated liver
enzymes. No additional clinical or laboratory data is available. The
histologic sections show a mild degree of mixed portal inflammation,
composed primarily of mature-appearing lymphocytes, with areas of mildly
to moderately increased numbers of eosinophils. No significant numbers of
plasma cells are seen. Interlobular bile ducts show mild epithelial injury
without evidence of bile duct loss. Minimal interface activity and no
significant ductular reaction are noted. The hepatic lobules show mild
necroinflammation with occasional acidophilic bodies as well as minimal
 
[place] [address]                    PATHOLOGY RPT PROCEDURE       
_______________________________________________________________________________
 
Name:       JUANIS SHAVER                  Room:           [address] [place]  
M.R.#:  [id number]      Account #:      [id number]  
Admission:  12/22/18     Date of Birth:  [birth year]  
Discharge:  01/01/19                   Report #:    [id number]
                                                         Path Case #: [id number]
_______________________________________________________________________________
steatosis without evidence of steatohepatitis. No granulomas, cholestasis,
or viral inclusions are seen. Special stains are provided. Trichrome and
reticulin stains show normal architecture and no definite fibrosis (stage
0 of 4). Iron stain is negative. PAS-D stain shows no intracytoplasmic
hyaline globules.
.
In summary, the findings in this case are those of mildly active hepatitis
with focally increased eosinophils. This pattern of injury is not entirely
specific with regard to etiology but suggests medication-induced injury.
Please correlate with any current and recent medications, as well as
over-the-counter drugs, alternative remedies, and dietary supplements. If
medication-induced injury is ruled out clinically, additional
considerations would include systemic inflammatory processes (reactive
hepatopathy) as well as infectious etiologies (including nonhepatotropic
viruses). There are no definite features of biliary obstructive disease,
autoimmune hepatitis, or steatohepatitis.
.
Thank you for sending us this case. Please do not hesitate to contact me
if you have any further questions or concerns.
.
.
Participated in the Interpretation
Alina Rivera M.D.-Pathology Fellow
.
Material Received
A. [id number]: Liver
24 stained slides
.
Report electronically signed by
Dayron Bailey M.D.
[id number]
.
I verify that I have examined all relevant slides/materials for the
specimen(s) and rendered or confirmed the diagnosis.
.
.
A complete copy of the report is on file.
.
Professional services performed by [address] [address].  [facility] by [facility] [address] [facility],
[facility] [address] [address].
.
(AMJ 01/02/2019)
.
This case was prepared and proofread by Dr. Galdino Allen and
electronically signed and released by Dr. Junior Olmstead.
.
(AMJ 01/02/2019)
.
 
[place] [address]                    PATHOLOGY RPT PROCEDURE       
_______________________________________________________________________________
 
Name:       JUANIS SHAVER                  Room:           [facility] [place]  
M.R.#:  [id number]      Account #:      [id number]  
Admission:  12/22/18     Date of Birth:  [birth year]  
Discharge:  01/01/19                   Report #:    [id number]
                                                         Path Case #: [id number]
_______________________________________________________________________________
AZJ/01/02/2019
Addendum Electronically Signed by Junior Olmstead MD. Pathologist
.                                                                02
Electronically signed:                                     .
Sandip Allen MD, Pathologist
NPI- [id number]
.                                                                01
Gross description:                                         .
Received in formalin labeled "Juanis Shaver, liver," are 3 distinct needle
cores of tan soft tissue ranging from 0.6 to 1.8 cm in length and
measuring less than 0.1 cm each in diameter.  Specimen is submitted
entirely in cassette A1 through A3.
(TSD; 12/27/2018)
TOB/TOB
.                                                                02
Pathologist provided ICD-10:
R94.5
.                                                                02
CPT                                                        .
631363
Specimen Comment: A courtesy copy of this report has been sent to
Specimen Comment: [phone].
Specimen Comment: Report sent to 
***Performed at:  01
   [address], [place], [place]  [id number]
   MD Conrad Posey MD Phone:  [phone]
***Performed at:  02
   [facility]
   [address], [place], [place]  [id number]
   MD Lc Deluna MD Phone:  [phone]

## 2019-01-03 NOTE — CON
[address]  [address]                    CONSULTATION                  
_______________________________________________________________________________
 
Name:       JUANIS MALIN                  Room:           [address] [place]  
M.R.#:  [id number]      Account #:      [id number]  
Admission:  18     Attend Phys:    Sudheer Trevino MD
Discharge:  19     Date of Birth:  [birth year]  
         Report #: [id number]
                                                                     [id number]  
_______________________________________________________________________________
THIS REPORT FOR:  //name//                      
 
CC: Dr. Neo Mckinley 
    [facility] physician/PCP
    Sudheer Trevino MD
 
DATE OF SERVICE:  2018
 
 
REFERRING PHYSICIAN:  Sudheer Trevino MD
 
REASON FOR CONSULTATION:
1.  Chronic diarrhea with abnormal CAT scans suggestive of left-sided colitis --
etiology uncertain.
2.  Right upper quadrant pain with fevers, chills and elevated liver function
test -- evaluate for possible choledocholithiasis versus chronic liver disease.
3.  Nausea and vomiting, weight loss secondary to acute illness over the last 2
weeks.
4.  Dehydration secondary to acute illness.
 
RECOMMENDATIONS:
1.  Agree with antibiotics at this point in time, but I still like for her to be
tested for possible C. diff.
2.  Place the patient contact precautions at this time.
3.  We will proceed with an MRCP in the morning stat to evaluate elevated LFTs
and if they are abnormal, she will need an EGD and ERCP to be done tomorrow
afternoon.
4.  We will repeat laboratory test tomorrow.
5.  She will need a colonoscopy during this hospital stay as well but I will
wait until we have the results back from her C. diff toxin assay and also to
evaluate her possible dilated biliary tree with EGD and ERCP.  I have discussed
these impressions and plans with the patient as well as her  who are in
agreement with the same.
 
HISTORY OF PRESENT ILLNESS:  The patient is a very pleasant 58-year-old white
female who has had problem with nausea, vomiting, diarrhea for the last 2 weeks.
 She has been feeling terrible for last 2 weeks and thought that she may have C.
diff.  She was recently seen through the Emergency Room and was thought that
this was the possibility, so they placed on antibiotics which did not improve
but same.  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
a nurse.  She has not had any problems like this in the past.  She has not been
on any recent antibiotics nor has she had any ill exposures.  She was admitted
to hospital for further evaluation and treatment.
 
ALLERGIES:  None.
 
 
 
[place] [address]                    CONSULTATION                  
_______________________________________________________________________________
 
Name:       JUANIS MALIN                  Room:           [address] [place]  
[facility].#:  [id number]      Account #:      [id number]  
Admission:  18     Attend Phys:    Sudheer Trevino MD
Discharge:  19     Date of Birth:  [birth year]  
         Report #: [id number]
                                                                     [id number]  
_______________________________________________________________________________
 
MEDICATIONS:  Cipro, Flagyl, Percocet.
 
Her medications at home include Anafranil, Ativan, Motrin, and Desyrel.
 
PAST MEDICAL HISTORY:  Remarkable for previous ovarian tumor removed.  She has
had previous problems with depression, anxiety, gallstone pancreatitis, which
she has had a previous cholecystectomy.  She had  in the past as well.
 
SOCIAL HISTORY:  The patient does not smoke.  Occasionally drinks alcohol.
 
FAMILY HISTORY:  Negative.
 
PHYSICAL EXAMINATION:
GENERAL:  Revealed pleasant 58-year-old white female who is awake and alert.
CARDIOPULMONARY:  Revealed a regular rate and rhythm.
LUNGS:  Clear.
ABDOMEN:  Soft and minimally tender in lower quadrants.  No rebound or guarding
noted.
 
LABORATORY DATA:  Revealed a white count of 11.3, hemoglobin 14.5, platelet
count 43,000, MCV is 96.2, and RDW 13.0.  Complete metabolic panel from the 
revealed sodium 133, potassium 3.6, chloride 95, bicarbonate is 28, BUN 6,
creatinine 0.8, total bilirubin is 1.6, alkaline phosphatase 242, AST is 198,
.  Albumin is 3.5.
 
CT scan of the abdomen and pelvis performed on 2018 was reviewed and
revealed possible thickening of the transverse and descending colon without
obvious source for the same.
 
DISCUSSION:  At the present time, the patient has had problem with nausea,
vomiting, diarrhea.  She also has elevated liver function tests.  We will
proceed with MRCP tomorrow and make further recommendations thereafter.
 
 
 
 
 
 
 
 
 
 
 
<ELECTRONICALLY SIGNED>
                                        By:  Rudolph Gonzales DO          
19     1202
D: 18 1703_______________________________________
T: 19 [id number] Gonzales DO             /nt

## 2021-11-11 ENCOUNTER — HOSPITAL ENCOUNTER (EMERGENCY)
Dept: HOSPITAL 96 - M.ERS | Age: 61
Discharge: HOME | End: 2021-11-11
Payer: COMMERCIAL

## 2021-11-11 VITALS — BODY MASS INDEX: 30.73 KG/M2 | HEIGHT: 64 IN | WEIGHT: 180.01 LBS

## 2021-11-11 VITALS — DIASTOLIC BLOOD PRESSURE: 98 MMHG | SYSTOLIC BLOOD PRESSURE: 120 MMHG

## 2021-11-11 DIAGNOSIS — Z90.710: ICD-10-CM

## 2021-11-11 DIAGNOSIS — M20.012: Primary | ICD-10-CM

## 2021-11-11 DIAGNOSIS — F32.9: ICD-10-CM

## 2021-11-11 DIAGNOSIS — Z90.49: ICD-10-CM

## 2021-11-11 DIAGNOSIS — Z79.899: ICD-10-CM
